# Patient Record
Sex: FEMALE | Race: OTHER | HISPANIC OR LATINO | Employment: UNEMPLOYED | URBAN - NONMETROPOLITAN AREA
[De-identification: names, ages, dates, MRNs, and addresses within clinical notes are randomized per-mention and may not be internally consistent; named-entity substitution may affect disease eponyms.]

---

## 2022-05-03 ENCOUNTER — OFFICE VISIT (OUTPATIENT)
Dept: OBSTETRICS AND GYNECOLOGY | Facility: CLINIC | Age: 27
End: 2022-05-03

## 2022-05-03 VITALS
RESPIRATION RATE: 17 BRPM | SYSTOLIC BLOOD PRESSURE: 107 MMHG | OXYGEN SATURATION: 99 % | WEIGHT: 142 LBS | HEART RATE: 78 BPM | HEIGHT: 61 IN | BODY MASS INDEX: 26.81 KG/M2 | DIASTOLIC BLOOD PRESSURE: 63 MMHG

## 2022-05-03 DIAGNOSIS — Z30.42 ENCOUNTER FOR DEPO-PROVERA CONTRACEPTION: Primary | ICD-10-CM

## 2022-05-03 DIAGNOSIS — Z32.02 URINE PREGNANCY TEST NEGATIVE: ICD-10-CM

## 2022-05-03 DIAGNOSIS — R10.2 PELVIC PAIN IN FEMALE: ICD-10-CM

## 2022-05-03 LAB
B-HCG UR QL: NEGATIVE
CTP QC/QA: YES

## 2022-05-03 PROCEDURE — 81025 POCT URINE PREGNANCY: ICD-10-PCS | Mod: QW,,, | Performed by: ADVANCED PRACTICE MIDWIFE

## 2022-05-03 PROCEDURE — 96372 PR INJECTION,THERAP/PROPH/DIAG2ST, IM OR SUBCUT: ICD-10-PCS | Mod: ,,, | Performed by: ADVANCED PRACTICE MIDWIFE

## 2022-05-03 PROCEDURE — 96372 THER/PROPH/DIAG INJ SC/IM: CPT | Mod: ,,, | Performed by: ADVANCED PRACTICE MIDWIFE

## 2022-05-03 PROCEDURE — 99203 PR OFFICE/OUTPT VISIT, NEW, LEVL III, 30-44 MIN: ICD-10-PCS | Mod: 25,,, | Performed by: ADVANCED PRACTICE MIDWIFE

## 2022-05-03 PROCEDURE — 81025 URINE PREGNANCY TEST: CPT | Mod: QW,,, | Performed by: ADVANCED PRACTICE MIDWIFE

## 2022-05-03 PROCEDURE — 99203 OFFICE O/P NEW LOW 30 MIN: CPT | Mod: 25,,, | Performed by: ADVANCED PRACTICE MIDWIFE

## 2022-05-03 RX ORDER — MEDROXYPROGESTERONE ACETATE 150 MG/ML
150 INJECTION, SUSPENSION INTRAMUSCULAR
Status: COMPLETED | OUTPATIENT
Start: 2022-05-03 | End: 2022-05-03

## 2022-05-03 RX ORDER — NAPROXEN 500 MG/1
500 TABLET ORAL 2 TIMES DAILY PRN
COMMUNITY
Start: 2021-11-28

## 2022-05-03 RX ADMIN — MEDROXYPROGESTERONE ACETATE 150 MG: 150 INJECTION, SUSPENSION INTRAMUSCULAR at 02:05

## 2022-05-04 PROBLEM — R10.2 PELVIC PAIN IN FEMALE: Status: ACTIVE | Noted: 2022-05-04

## 2022-05-04 NOTE — PROGRESS NOTES
"  Patient ID:  Kierra Roman is a 26 y.o. female      Chief Complaint:   Chief Complaint   Patient presents with    Cramping     IN OVARIES    Contraception     Depo        HPI:  Kierra is a non-English speaking female in for repeat depo injection with c/o lower abd pain. S/O present as well but he does not speak English.  used thru clinic approved computer video system. She has previously received injections at a clinic in [place] and has had lower abd pain off and on x 2 months.  She was seen in [facility] ER 2022 with c/o abd pain. She has copies of ER discharge info which lists muscle/groin strain as dx, takes Ibuprofen for pain management.  [facility] medical records dept was called to find out what testing was done. Notified that an abd CT scan and pelvic US were completed. Will have release signed to obtain reports then follow up in 1 week.   LMP: No LMP recorded. Patient has had an injection.  Sexually active: yes  Last Pap: She reports having a Pap 3 years ago in [place]      History reviewed. No pertinent past medical history.  History reviewed. No pertinent surgical history.    OB History        2    Para   2    Term                AB        Living   2       SAB        IAB        Ectopic        Multiple        Live Births   2           Obstetric Comments   Vaginal deliveries             /63 (BP Location: Right arm)   Pulse 78   Resp 17   Ht 5' 1" (1.549 m)   Wt 64.4 kg (142 lb)   SpO2 99%   BMI 26.83 kg/m²   Wt Readings from Last 3 Encounters:   22 64.4 kg (142 lb)          ROS:  Review of Systems   Constitutional: Negative.    Eyes: Negative.    Respiratory: Negative.    Cardiovascular: Negative.    Gastrointestinal: Negative.    Genitourinary: Positive for pelvic pain. Negative for menstrual problem, vaginal bleeding and vaginal discharge.   Musculoskeletal: Negative.    Neurological: Negative.    Psychiatric/Behavioral: Negative.     " "      PHYSICAL EXAM:  Physical Exam  Constitutional:       Appearance: Normal appearance.   Eyes:      Extraocular Movements: Extraocular movements intact.   Cardiovascular:      Rate and Rhythm: Normal rate.      Pulses: Normal pulses.   Pulmonary:      Effort: Pulmonary effort is normal.   Abdominal:      Palpations: Abdomen is soft.      Tenderness: There is abdominal tenderness in the right lower quadrant and left lower quadrant. There is no guarding or rebound.   Musculoskeletal:         General: Normal range of motion.      Cervical back: Normal range of motion.   Skin:     General: Skin is warm and dry.   Neurological:      Mental Status: She is alert and oriented to person, place, and time.   Psychiatric:         Mood and Affect: Mood normal.         Behavior: Behavior normal.         Thought Content: Thought content normal.         Judgment: Judgment normal.          Assessment:  Encounter for Depo-Provera contraception  -     POCT urine pregnancy  -     medroxyPROGESTERone (DEPO-PROVERA) injection 150 mg    Pelvic pain in female    Urine pregnancy test negative    BMI 26.0-26.9,adult          ICD-10-CM ICD-9-CM    1. Encounter for Depo-Provera contraception  Z30.42 V25.49 POCT urine pregnancy      medroxyPROGESTERone (DEPO-PROVERA) injection 150 mg   2. Pelvic pain in female  R10.2 625.9    3. Urine pregnancy test negative  Z32.02 V72.41    4. BMI 26.0-26.9,adult  Z68.26 V85.22        Plan:  UPT negtaive  Depo Provera 150 mg IM given, Reviewed "ACHES" of contraceptives and possible S/E  Encouraged Vitamin D and calcium supplements  Continue Ibuprofen prn for pain, take an extra strength Tylenol with each Ibuprofen if needed  Follow up in about 1 week (around 5/10/2022) for nurse visit, 3 months repeat depo.                    "